# Patient Record
Sex: MALE | Race: WHITE | NOT HISPANIC OR LATINO | Employment: FULL TIME | URBAN - METROPOLITAN AREA
[De-identification: names, ages, dates, MRNs, and addresses within clinical notes are randomized per-mention and may not be internally consistent; named-entity substitution may affect disease eponyms.]

---

## 2022-07-28 ENCOUNTER — OFFICE VISIT (OUTPATIENT)
Dept: URGENT CARE | Facility: CLINIC | Age: 22
End: 2022-07-28
Payer: COMMERCIAL

## 2022-07-28 VITALS
BODY MASS INDEX: 19.99 KG/M2 | OXYGEN SATURATION: 99 % | TEMPERATURE: 97 F | RESPIRATION RATE: 16 BRPM | SYSTOLIC BLOOD PRESSURE: 117 MMHG | HEART RATE: 74 BPM | HEIGHT: 69 IN | WEIGHT: 135 LBS | DIASTOLIC BLOOD PRESSURE: 59 MMHG

## 2022-07-28 DIAGNOSIS — H65.192 OTHER NON-RECURRENT ACUTE NONSUPPURATIVE OTITIS MEDIA OF LEFT EAR: ICD-10-CM

## 2022-07-28 DIAGNOSIS — K02.9 PAIN DUE TO DENTAL CARIES: Primary | ICD-10-CM

## 2022-07-28 PROCEDURE — 99213 OFFICE O/P EST LOW 20 MIN: CPT | Performed by: PHYSICIAN ASSISTANT

## 2022-07-28 RX ORDER — BUSPIRONE HYDROCHLORIDE 15 MG/1
15 TABLET ORAL 2 TIMES DAILY
COMMUNITY
Start: 2022-07-14

## 2022-07-28 RX ORDER — CALCIUM CARBONATE 750 MG/1
750 TABLET, CHEWABLE ORAL DAILY
COMMUNITY

## 2022-07-28 RX ORDER — DEXTROAMPHETAMINE SACCHARATE, AMPHETAMINE ASPARTATE MONOHYDRATE, DEXTROAMPHETAMINE SULFATE AND AMPHETAMINE SULFATE 3.75; 3.75; 3.75; 3.75 MG/1; MG/1; MG/1; MG/1
15 CAPSULE, EXTENDED RELEASE ORAL DAILY
COMMUNITY
Start: 2022-07-20

## 2022-07-28 RX ORDER — IBUPROFEN 800 MG/1
800 TABLET ORAL AS NEEDED
COMMUNITY
Start: 2022-07-19

## 2022-07-28 RX ORDER — AMOXICILLIN 500 MG/1
500 CAPSULE ORAL EVERY 8 HOURS
COMMUNITY
Start: 2022-07-19

## 2022-07-28 RX ORDER — VENLAFAXINE HYDROCHLORIDE 37.5 MG/1
37.5 CAPSULE, EXTENDED RELEASE ORAL DAILY
COMMUNITY
Start: 2022-04-26

## 2022-07-28 RX ORDER — LORATADINE 10 MG/1
10 TABLET ORAL DAILY
COMMUNITY
Start: 2022-07-10

## 2022-07-28 RX ORDER — CLINDAMYCIN HYDROCHLORIDE 300 MG/1
300 CAPSULE ORAL 3 TIMES DAILY
Qty: 30 CAPSULE | Refills: 0 | Status: SHIPPED | OUTPATIENT
Start: 2022-07-28 | End: 2022-08-07

## 2022-07-28 RX ORDER — GUANFACINE 2 MG/1
2 TABLET, EXTENDED RELEASE ORAL DAILY
COMMUNITY
Start: 2022-06-17

## 2022-07-28 NOTE — PATIENT INSTRUCTIONS
Continue amoxicillin begin clindamycin  Must take with food to help with nausea, vomiting, and diarrhea  Consider eating yogurt with active cultures or taking a probiotic  Continue with Tylenol ibuprofen as needed for pain  Saltwater gargles  Temporary filling at the pharmacy  Keep appointment with a dentist   Follow-up with family doctor in 3-5 days  Go to ER if symptoms become severe

## 2022-07-28 NOTE — PROGRESS NOTES
[facility]        NAME: Ricarda Hernandez is a 25 y o  male  : 2000    MRN: [id number]  DATE: 2022  TIME: 8:10 AM    Assessment and Plan   Pain due to dental caries [K02 9]  1  Pain due to dental caries  clindamycin (CLEOCIN) 300 MG capsule   2  Other non-recurrent acute nonsuppurative otitis media of left ear  clindamycin (CLEOCIN) 300 MG capsule     Discussed with patient that pain may be from nerve being exposed  Will trial switching from amoxicillin to clindamycin  Advised to call the dentist     Patient Instructions   Discontinue amoxicillin and begin clindamycin  Tylenol ibuprofen  Over-the-counter temporary filling  Chew opposite side of pain  Called the dentist     Follow up with PCP in 3-5 days  Proceed to  ER if symptoms worsen  Chief Complaint     Chief Complaint   Patient presents with    Dental Pain     C/o Pain in left bottom molar x 4 weeks, now radiating into left jaw and ear  Seen by dentist and started on amoxicillin and motrin  History of Present Illness       Patient is a 81-year-old male with no significant past medical history presents the office complaining of pain to left bottom molar for 4 weeks  States pain has increased and is now located into the jaw and left ear  Denies fevers, chills, congestion, rhinorrhea, sore throat, cough, nausea, or vomiting  Patient has known cavity in this area in has appointment to have filling but dentist wanted treat underlying infection 1st   Patient also has known impacted wisdom tooth which needs to be removed and causes pain occasionally  He is currently taking amoxicillin 500 mg twice a day for 1 week  Pain is managed with Tylenol ibuprofen  He is concerned that pain is spreading even though he is on antibiotics  Review of Systems   Review of Systems   Constitutional: Negative for chills and fever  HENT: Positive for dental problem and ear pain   Negative for congestion, postnasal drip, rhinorrhea and sore throat  Respiratory: Negative for cough and shortness of breath  Cardiovascular: Negative for chest pain and palpitations  Gastrointestinal: Negative for abdominal pain, diarrhea, nausea and vomiting  Musculoskeletal: Negative for myalgias  Neurological: Negative for dizziness, light-headedness and headaches  Current Medications       Current Outpatient Medications:     amoxicillin (AMOXIL) 500 mg capsule, Take 500 mg by mouth every 8 (eight) hours, Disp: , Rfl:     amphetamine-dextroamphetamine (ADDERALL XR) 15 MG 24 hr capsule, Take 15 mg by mouth daily, Disp: , Rfl:     B COMPLEX VITAMINS PO, Take 1 tablet by mouth 2 (two) times a day, Disp: , Rfl:     calcium carbonate (TUMS EX) 750 mg chewable tablet, Chew 750 mg daily, Disp: , Rfl:     clindamycin (CLEOCIN) 300 MG capsule, Take 1 capsule (300 mg total) by mouth 3 (three) times a day for 10 days, Disp: 30 capsule, Rfl: 0    guanFACINE HCl ER (INTUNIV) 2 MG TB24, Take 2 mg by mouth daily, Disp: , Rfl:     ibuprofen (MOTRIN) 800 mg tablet, Take 800 mg by mouth as needed, Disp: , Rfl:     loratadine (CLARITIN) 10 mg tablet, Take 10 mg by mouth daily, Disp: , Rfl:     venlafaxine (EFFEXOR-XR) 37 5 mg 24 hr capsule, Take 37 5 mg by mouth daily, Disp: , Rfl:     busPIRone (BUSPAR) 15 mg tablet, Take 15 mg by mouth 2 (two) times a day, Disp: , Rfl:     Current Allergies     Allergies as of 07/28/2022    (No Known Allergies)            The following portions of the patient's history were reviewed and updated as appropriate: allergies, current medications, past family history, past medical history, past social history, past surgical history and problem list      Past Medical History:   Diagnosis Date    Allergic     Anxiety     Asperger's syndrome        Past Surgical History:   Procedure Laterality Date    NO PAST SURGERIES         History reviewed  No pertinent family history        Medications have been verified  Objective   /59   Pulse 74   Temp (!) 97 °F (36 1 °C)   Resp 16   Ht 5' 9" (1 753 m)   Wt 61 2 kg (135 lb)   SpO2 99%   BMI 19 94 kg/m²   No LMP for male patient  Physical Exam     Physical Exam  Vitals and nursing note reviewed  Constitutional:       Appearance: Normal appearance  He is well-developed  HENT:      Head: Normocephalic and atraumatic  Right Ear: Tympanic membrane, ear canal and external ear normal       Left Ear: Ear canal and external ear normal  A middle ear effusion is present  Tympanic membrane is erythematous  Nose: Nose normal       Mouth/Throat:      Dentition: Dental caries present  No dental tenderness, gingival swelling or dental abscesses  Pharynx: Uvula midline  Eyes:      General: Lids are normal       Conjunctiva/sclera: Conjunctivae normal       Pupils: Pupils are equal, round, and reactive to light  Cardiovascular:      Rate and Rhythm: Normal rate and regular rhythm  Heart sounds: Normal heart sounds  No murmur heard  No friction rub  No gallop  Pulmonary:      Effort: Pulmonary effort is normal       Breath sounds: Normal breath sounds  No stridor  No wheezing or rales  Musculoskeletal:         General: Normal range of motion  Cervical back: Neck supple  Skin:     General: Skin is warm and dry  Capillary Refill: Capillary refill takes less than 2 seconds  Neurological:      Mental Status: He is alert

## 2023-04-30 ENCOUNTER — OFFICE VISIT (OUTPATIENT)
Dept: URGENT CARE | Facility: CLINIC | Age: 23
End: 2023-04-30

## 2023-04-30 VITALS
DIASTOLIC BLOOD PRESSURE: 68 MMHG | OXYGEN SATURATION: 97 % | BODY MASS INDEX: 19.99 KG/M2 | WEIGHT: 135 LBS | TEMPERATURE: 97.3 F | HEIGHT: 69 IN | RESPIRATION RATE: 16 BRPM | SYSTOLIC BLOOD PRESSURE: 122 MMHG | HEART RATE: 84 BPM

## 2023-04-30 DIAGNOSIS — K08.89 PAIN, DENTAL: Primary | ICD-10-CM

## 2023-04-30 RX ORDER — AMOXICILLIN 875 MG/1
875 TABLET, COATED ORAL 2 TIMES DAILY
Qty: 14 TABLET | Refills: 0 | Status: SHIPPED | OUTPATIENT
Start: 2023-04-30 | End: 2023-05-07

## 2023-04-30 NOTE — PROGRESS NOTES
[facility]        NAME: Meli Rae is a 25 y o  male  : 2000    MRN: [id number]  DATE: 2023  TIME: 2:32 PM    Assessment and Plan   Pain, dental [K08 89]  1  Pain, dental  amoxicillin (AMOXIL) 875 mg tablet            Patient Instructions     Take the antibiotics with food  Follow-up with dentist tomorrow  Follow up with PCP in 3-5 days  Proceed to  ER if symptoms worsen  Chief Complaint     Chief Complaint   Patient presents with    Dental Pain     Left side mouth/dental pain; left facial pain up to ear   Has been going on for the past week   Had wisdom teeth taken out at end of march does report having a cavity in same area currently has a temporary crown          History of Present Illness       Patient is a 22YOM presenting with left sided facial pain radiating into his ear  He had his wisdom teeth extracted the end of march and had a large cavity requiring a crown and removal of bone done  He has had issues with pain since the surgery  Dental Pain         Review of Systems   Review of Systems   HENT: Positive for dental problem  Negative for congestion and ear pain  Respiratory: Negative for cough  All other systems reviewed and are negative          Current Medications       Current Outpatient Medications:     amoxicillin (AMOXIL) 875 mg tablet, Take 1 tablet (875 mg total) by mouth 2 (two) times a day for 7 days, Disp: 14 tablet, Rfl: 0    ibuprofen (MOTRIN) 800 mg tablet, Take 800 mg by mouth as needed, Disp: , Rfl:     amoxicillin (AMOXIL) 500 mg capsule, Take 500 mg by mouth every 8 (eight) hours (Patient not taking: Reported on 2023), Disp: , Rfl:     amphetamine-dextroamphetamine (ADDERALL XR) 15 MG 24 hr capsule, Take 15 mg by mouth daily (Patient not taking: Reported on 2023), Disp: , Rfl:     B COMPLEX VITAMINS PO, Take 1 tablet by mouth 2 (two) times a day (Patient not taking: Reported on 2023), Disp: , Rfl:     busPIRone (BUSPAR) 15 "mg tablet, Take 15 mg by mouth 2 (two) times a day (Patient not taking: Reported on 4/30/2023), Disp: , Rfl:     calcium carbonate (TUMS EX) 750 mg chewable tablet, Chew 750 mg daily (Patient not taking: Reported on 4/30/2023), Disp: , Rfl:     guanFACINE HCl ER (INTUNIV) 2 MG TB24, Take 2 mg by mouth daily (Patient not taking: Reported on 4/30/2023), Disp: , Rfl:     loratadine (CLARITIN) 10 mg tablet, Take 10 mg by mouth daily (Patient not taking: Reported on 4/30/2023), Disp: , Rfl:     venlafaxine (EFFEXOR-XR) 37 5 mg 24 hr capsule, Take 37 5 mg by mouth daily (Patient not taking: Reported on 4/30/2023), Disp: , Rfl:     Current Allergies     Allergies as of 04/30/2023    (No Known Allergies)            The following portions of the patient's history were reviewed and updated as appropriate: allergies, current medications, past family history, past medical history, past social history, past surgical history and problem list      Past Medical History:   Diagnosis Date    Allergic     Anxiety     Asperger's syndrome        Past Surgical History:   Procedure Laterality Date    NO PAST SURGERIES      WISDOM TOOTH EXTRACTION Bilateral        History reviewed  No pertinent family history  Medications have been verified  Objective   /68   Pulse 84   Temp (!) 97 3 °F (36 3 °C)   Resp 16   Ht 5' 9\" (1 753 m)   Wt 61 2 kg (135 lb)   SpO2 97%   BMI 19 94 kg/m²        Physical Exam     Physical Exam  Vitals and nursing note reviewed  Constitutional:       General: He is not in acute distress  Appearance: Normal appearance  He is normal weight  He is not ill-appearing or toxic-appearing  HENT:      Mouth/Throat:      Pharynx: Oropharynx is clear  Cardiovascular:      Rate and Rhythm: Normal rate  Pulmonary:      Effort: Pulmonary effort is normal    Neurological:      Mental Status: He is alert                     "